# Patient Record
Sex: FEMALE | Race: WHITE | Employment: STUDENT | URBAN - METROPOLITAN AREA
[De-identification: names, ages, dates, MRNs, and addresses within clinical notes are randomized per-mention and may not be internally consistent; named-entity substitution may affect disease eponyms.]

---
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
## 2023-02-16 ENCOUNTER — TELEPHONE (OUTPATIENT)
Dept: FAMILY MEDICINE CLINIC | Facility: CLINIC | Age: 18
End: 2023-02-16

## 2023-02-16 NOTE — TELEPHONE ENCOUNTER
Pt called this am and left msg on vm that she found a lump on her right breast.     Called pt back and she said that she found a lump (difference) on her right breast.   Area is hard. No swelling. No pain. She is due to get her period. Her breasts do feel tender she said but most likely because her period is due. Please advise.

## 2023-03-06 ENCOUNTER — OFFICE VISIT (OUTPATIENT)
Dept: FAMILY MEDICINE CLINIC | Facility: CLINIC | Age: 18
End: 2023-03-06
Payer: COMMERCIAL

## 2023-03-06 VITALS
SYSTOLIC BLOOD PRESSURE: 118 MMHG | TEMPERATURE: 98 F | WEIGHT: 104 LBS | HEART RATE: 84 BPM | DIASTOLIC BLOOD PRESSURE: 64 MMHG | RESPIRATION RATE: 16 BRPM | OXYGEN SATURATION: 98 % | HEIGHT: 59 IN | BODY MASS INDEX: 20.96 KG/M2

## 2023-03-06 DIAGNOSIS — Z13.228 SCREENING FOR ENDOCRINE, NUTRITIONAL, METABOLIC AND IMMUNITY DISORDER: ICD-10-CM

## 2023-03-06 DIAGNOSIS — Z13.29 SCREENING FOR ENDOCRINE, NUTRITIONAL, METABOLIC AND IMMUNITY DISORDER: ICD-10-CM

## 2023-03-06 DIAGNOSIS — Z30.011 OCP (ORAL CONTRACEPTIVE PILLS) INITIATION: ICD-10-CM

## 2023-03-06 DIAGNOSIS — Z13.21 SCREENING FOR ENDOCRINE, NUTRITIONAL, METABOLIC AND IMMUNITY DISORDER: ICD-10-CM

## 2023-03-06 DIAGNOSIS — N63.13 MASS OF LOWER OUTER QUADRANT OF RIGHT BREAST: Primary | ICD-10-CM

## 2023-03-06 DIAGNOSIS — R20.2 TINGLING IN EXTREMITIES: ICD-10-CM

## 2023-03-06 DIAGNOSIS — Z13.0 SCREENING FOR ENDOCRINE, NUTRITIONAL, METABOLIC AND IMMUNITY DISORDER: ICD-10-CM

## 2023-03-06 PROCEDURE — 3008F BODY MASS INDEX DOCD: CPT | Performed by: FAMILY MEDICINE

## 2023-03-06 PROCEDURE — 3078F DIAST BP <80 MM HG: CPT | Performed by: FAMILY MEDICINE

## 2023-03-06 PROCEDURE — 3074F SYST BP LT 130 MM HG: CPT | Performed by: FAMILY MEDICINE

## 2023-03-06 PROCEDURE — 99214 OFFICE O/P EST MOD 30 MIN: CPT | Performed by: FAMILY MEDICINE

## 2023-03-06 RX ORDER — LEVONORGESTREL AND ETHINYL ESTRADIOL 0.1-0.02MG
1 KIT ORAL DAILY
Qty: 3 EACH | Refills: 3 | Status: SHIPPED | OUTPATIENT
Start: 2023-03-06 | End: 2024-03-05

## 2023-03-07 PROBLEM — R20.2 TINGLING IN EXTREMITIES: Status: ACTIVE | Noted: 2023-03-07

## 2023-03-07 PROBLEM — N63.13 MASS OF LOWER OUTER QUADRANT OF RIGHT BREAST: Status: ACTIVE | Noted: 2023-03-07

## 2023-03-31 ENCOUNTER — HOSPITAL ENCOUNTER (OUTPATIENT)
Dept: ULTRASOUND IMAGING | Age: 18
Discharge: HOME | End: 2023-03-31
Attending: FAMILY MEDICINE
Payer: COMMERCIAL

## 2023-03-31 DIAGNOSIS — N63.13 MASS OF LOWER OUTER QUADRANT OF RIGHT BREAST: ICD-10-CM

## 2023-03-31 PROCEDURE — 76642 ULTRASOUND BREAST LIMITED: CPT | Performed by: FAMILY MEDICINE

## 2023-03-31 NOTE — PROGRESS NOTES
Breast ultrasound shows benign breast tissue that is very dense, which could feel like a mass. Follow-up if you continue to feel any abnormalities.

## 2023-08-02 ENCOUNTER — HOSPITAL ENCOUNTER (OUTPATIENT)
Age: 18
Discharge: HOME | End: 2023-08-02
Payer: COMMERCIAL

## 2023-08-02 VITALS
OXYGEN SATURATION: 97 % | TEMPERATURE: 98 F | HEART RATE: 57 BPM | BODY MASS INDEX: 19.63 KG/M2 | SYSTOLIC BLOOD PRESSURE: 108 MMHG | WEIGHT: 100 LBS | DIASTOLIC BLOOD PRESSURE: 75 MMHG | HEIGHT: 60 IN | RESPIRATION RATE: 18 BRPM

## 2023-08-02 DIAGNOSIS — L20.9 ATOPIC DERMATITIS, UNSPECIFIED TYPE: Primary | ICD-10-CM

## 2023-08-02 PROCEDURE — 99213 OFFICE O/P EST LOW 20 MIN: CPT

## 2023-08-02 RX ORDER — PREDNISONE 10 MG/1
TABLET ORAL
Qty: 20 TABLET | Refills: 0 | Status: SHIPPED | OUTPATIENT
Start: 2023-08-02 | End: 2023-08-09

## 2023-08-30 ENCOUNTER — OFFICE VISIT (OUTPATIENT)
Dept: FAMILY MEDICINE CLINIC | Facility: CLINIC | Age: 18
End: 2023-08-30
Payer: COMMERCIAL

## 2023-08-30 VITALS
WEIGHT: 96 LBS | TEMPERATURE: 98 F | SYSTOLIC BLOOD PRESSURE: 98 MMHG | HEIGHT: 60 IN | OXYGEN SATURATION: 98 % | BODY MASS INDEX: 18.85 KG/M2 | HEART RATE: 68 BPM | RESPIRATION RATE: 18 BRPM | DIASTOLIC BLOOD PRESSURE: 58 MMHG

## 2023-08-30 DIAGNOSIS — R63.6 LOW WEIGHT: ICD-10-CM

## 2023-08-30 DIAGNOSIS — Z00.00 WELL FEMALE EXAM WITHOUT GYNECOLOGICAL EXAM: ICD-10-CM

## 2023-08-30 DIAGNOSIS — N92.0 SPOTTING: ICD-10-CM

## 2023-08-30 DIAGNOSIS — Z11.3 ROUTINE SCREENING FOR STI (SEXUALLY TRANSMITTED INFECTION): ICD-10-CM

## 2023-08-30 DIAGNOSIS — L30.9 ECZEMA OF FACE: ICD-10-CM

## 2023-08-30 DIAGNOSIS — Z30.41 ENCOUNTER FOR BIRTH CONTROL PILLS MAINTENANCE: Primary | ICD-10-CM

## 2023-08-30 LAB
CONTROL LINE PRESENT WITH A CLEAR BACKGROUND (YES/NO): YES YES/NO
PREGNANCY TEST, URINE: NEGATIVE

## 2023-08-30 PROCEDURE — 81025 URINE PREGNANCY TEST: CPT | Performed by: FAMILY MEDICINE

## 2023-08-30 PROCEDURE — 3078F DIAST BP <80 MM HG: CPT | Performed by: FAMILY MEDICINE

## 2023-08-30 PROCEDURE — 3074F SYST BP LT 130 MM HG: CPT | Performed by: FAMILY MEDICINE

## 2023-08-30 PROCEDURE — 87491 CHLMYD TRACH DNA AMP PROBE: CPT | Performed by: FAMILY MEDICINE

## 2023-08-30 PROCEDURE — 99212 OFFICE O/P EST SF 10 MIN: CPT | Performed by: FAMILY MEDICINE

## 2023-08-30 PROCEDURE — 3008F BODY MASS INDEX DOCD: CPT | Performed by: FAMILY MEDICINE

## 2023-08-30 PROCEDURE — 87591 N.GONORRHOEAE DNA AMP PROB: CPT | Performed by: FAMILY MEDICINE

## 2023-08-30 PROCEDURE — 99395 PREV VISIT EST AGE 18-39: CPT | Performed by: FAMILY MEDICINE

## 2023-08-31 PROBLEM — N63.13 MASS OF LOWER OUTER QUADRANT OF RIGHT BREAST: Status: RESOLVED | Noted: 2023-03-07 | Resolved: 2023-08-31

## 2023-08-31 PROBLEM — R20.2 TINGLING IN EXTREMITIES: Status: RESOLVED | Noted: 2023-03-07 | Resolved: 2023-08-31

## 2023-08-31 LAB
C TRACH DNA SPEC QL NAA+PROBE: NEGATIVE
N GONORRHOEA DNA SPEC QL NAA+PROBE: NEGATIVE

## 2023-09-28 PROBLEM — R59.0 INGUINAL LYMPHADENOPATHY: Status: ACTIVE | Noted: 2023-09-28

## 2023-09-28 PROBLEM — F51.04 PSYCHOPHYSIOLOGICAL INSOMNIA: Status: ACTIVE | Noted: 2023-09-28

## 2023-09-28 PROBLEM — R63.0 DECREASED APPETITE: Status: ACTIVE | Noted: 2023-09-28

## 2023-09-28 PROBLEM — R53.82 CHRONIC FATIGUE: Status: ACTIVE | Noted: 2023-09-28

## 2023-09-28 PROBLEM — F41.1 GAD (GENERALIZED ANXIETY DISORDER): Status: ACTIVE | Noted: 2023-09-28

## 2023-09-28 PROBLEM — R63.4 WEIGHT LOSS: Status: ACTIVE | Noted: 2021-04-21

## 2023-10-24 DIAGNOSIS — Z30.011 OCP (ORAL CONTRACEPTIVE PILLS) INITIATION: ICD-10-CM

## 2023-10-24 RX ORDER — LEVONORGESTREL AND ETHINYL ESTRADIOL 0.1-0.02MG
1 KIT ORAL DAILY
Qty: 3 EACH | Refills: 3 | Status: SHIPPED | OUTPATIENT
Start: 2023-10-24 | End: 2024-10-23

## 2023-11-01 DIAGNOSIS — F41.1 GAD (GENERALIZED ANXIETY DISORDER): ICD-10-CM

## 2023-11-01 PROCEDURE — 87591 N.GONORRHOEAE DNA AMP PROB: CPT | Performed by: FAMILY MEDICINE

## 2023-11-01 PROCEDURE — 87510 GARDNER VAG DNA DIR PROBE: CPT | Performed by: FAMILY MEDICINE

## 2023-11-01 PROCEDURE — 87491 CHLMYD TRACH DNA AMP PROBE: CPT | Performed by: FAMILY MEDICINE

## 2023-11-01 PROCEDURE — 87529 HSV DNA AMP PROBE: CPT | Performed by: FAMILY MEDICINE

## 2023-11-01 PROCEDURE — 87660 TRICHOMONAS VAGIN DIR PROBE: CPT | Performed by: FAMILY MEDICINE

## 2023-11-01 PROCEDURE — 87480 CANDIDA DNA DIR PROBE: CPT | Performed by: FAMILY MEDICINE

## 2023-11-02 PROBLEM — N90.89 LABIAL LESION: Status: ACTIVE | Noted: 2023-11-02

## 2023-11-02 RX ORDER — METRONIDAZOLE 7.5 MG/G
1 GEL VAGINAL NIGHTLY
Qty: 1 EACH | Refills: 0 | Status: SHIPPED | OUTPATIENT
Start: 2023-11-02 | End: 2023-11-07

## 2023-12-09 PROBLEM — R63.4 WEIGHT LOSS: Status: RESOLVED | Noted: 2021-04-21 | Resolved: 2023-12-09

## 2023-12-09 PROBLEM — N90.89 LABIAL LESION: Status: RESOLVED | Noted: 2023-11-02 | Resolved: 2023-12-09

## 2023-12-09 PROBLEM — H01.135 ECZEMATOUS DERMATITIS OF UPPER AND LOWER EYELIDS OF BOTH EYES: Status: ACTIVE | Noted: 2023-12-09

## 2023-12-09 PROBLEM — R59.0 INGUINAL LYMPHADENOPATHY: Status: RESOLVED | Noted: 2023-09-28 | Resolved: 2023-12-09

## 2023-12-09 PROBLEM — H01.134 ECZEMATOUS DERMATITIS OF UPPER AND LOWER EYELIDS OF BOTH EYES: Status: ACTIVE | Noted: 2023-12-09

## 2023-12-09 PROBLEM — Z86.19 HISTORY OF POSITIVE PCR FOR HERPES SIMPLEX VIRUS TYPE 1 (HSV-1) DNA: Status: ACTIVE | Noted: 2023-12-09

## 2023-12-09 PROBLEM — H01.131 ECZEMATOUS DERMATITIS OF UPPER AND LOWER EYELIDS OF BOTH EYES: Status: ACTIVE | Noted: 2023-12-09

## 2023-12-09 PROBLEM — Z79.899 MEDICATION MANAGEMENT: Status: ACTIVE | Noted: 2022-07-16

## 2023-12-09 PROBLEM — R53.82 CHRONIC FATIGUE: Status: RESOLVED | Noted: 2023-09-28 | Resolved: 2023-12-09

## 2023-12-09 PROBLEM — H01.132 ECZEMATOUS DERMATITIS OF UPPER AND LOWER EYELIDS OF BOTH EYES: Status: ACTIVE | Noted: 2023-12-09

## 2024-02-08 ENCOUNTER — OFFICE VISIT (OUTPATIENT)
Dept: FAMILY MEDICINE CLINIC | Facility: CLINIC | Age: 19
End: 2024-02-08
Payer: COMMERCIAL

## 2024-02-08 VITALS
TEMPERATURE: 98 F | HEART RATE: 72 BPM | HEIGHT: 59 IN | OXYGEN SATURATION: 98 % | WEIGHT: 106 LBS | BODY MASS INDEX: 21.37 KG/M2 | RESPIRATION RATE: 16 BRPM

## 2024-02-08 DIAGNOSIS — Z20.822 EXPOSURE TO COVID-19 VIRUS: Primary | ICD-10-CM

## 2024-02-08 PROCEDURE — 99212 OFFICE O/P EST SF 10 MIN: CPT | Performed by: NURSE PRACTITIONER

## 2024-02-08 PROCEDURE — 87635 SARS-COV-2 COVID-19 AMP PRB: CPT | Performed by: NURSE PRACTITIONER

## 2024-02-08 PROCEDURE — 3008F BODY MASS INDEX DOCD: CPT | Performed by: NURSE PRACTITIONER

## 2024-02-08 NOTE — PROGRESS NOTES
CHIEF COMPLAINT:     Chief Complaint   Patient presents with    Covid     Exposed to covid-19, no symptoms        HPI:   Yajaira Espinal is a 18 year old female who presents for Covid 19 exposure .  Denies GI symptoms, respiratory symptoms, body aches, fever.  Requesting covid testing.  At this time not taking any OTC medications.  Would like to be seen and have Covid testing for exposure.  Vaccine status: pt is vaccinated, had mild covid in 2022      Current Outpatient Medications   Medication Sig Dispense Refill    sertraline 50 MG Oral Tab Take 1 tablet (50 mg total) by mouth daily. 90 tablet 0    Levonorgestrel-Ethinyl Estrad 0.1-20 MG-MCG Oral Tab Take 1 tablet by mouth daily. 3 each 3    Pediatric Multivit-Minerals-C (SMARTY PANTS KIDS COMPLETE) Oral Chew Tab Chew 2 tablets by mouth daily.      Carbonyl Iron (IRON CHEWS PEDIATRIC OR) Take 1 tablet by mouth every other day.        Past Medical History:   Diagnosis Date    Anxiety     Labial lesion     Situational anxiety 04/21/2021    Weight loss     Weight loss, non-intentional 04/21/2021      Past Surgical History:   Procedure Laterality Date    ADENOIDECTOMY  2008    TONSILLECTOMY  2008         Social History     Socioeconomic History    Marital status: Single   Tobacco Use    Smoking status: Never    Smokeless tobacco: Never   Vaping Use    Vaping Use: Never used   Substance and Sexual Activity    Alcohol use: No     Alcohol/week: 0.0 standard drinks of alcohol    Drug use: No    Sexual activity: Yes     Partners: Male   Other Topics Concern     Service No    Blood Transfusions No    Caffeine Concern Yes     Comment: 1 iced coffee weekly    Hobby Hazards No    Sleep Concern No    Stress Concern No    Weight Concern No    Special Diet No    Back Care No    Exercise Yes     Comment: active daily    Bike Helmet No    Seat Belt Yes    Self-Exams No         REVIEW OF SYSTEMS:   GENERAL: feels well otherwise,   intact appetite  SKIN: no rashes or abnormal  skin lesions  HEENT: See HPI  LUNGS: denies shortness of breath or wheezing, See HPI  CARDIOVASCULAR: denies chest pain or palpitations   GI: denies N/V/C or abdominal pain  NEURO: Denies headaches    EXAM:   Pulse 72   Temp 98.1 °F (36.7 °C) (Temporal)   Resp 16   Ht 4' 11\" (1.499 m)   Wt 106 lb (48.1 kg)   LMP 01/22/2024 (Exact Date)   SpO2 98%   BMI 21.41 kg/m²   GENERAL: well developed, well nourished, in no apparent distress  SKIN: no rashes  THROAT: Oral mucosa pink, moist. Posterior pharynx is not erythematous. no exudates.    NECK: Supple, non-tender  LUNGS: clear to auscultation bilaterally; good air movement.  Breathing is non labored.  CARDIO: RRR without murmur  GI: active BS's x4,no masses, hepatosplenomegaly, or tenderness on direct palpation  LYMPH:  no cervical lymphadenopathy.    PSYCH: pleasant mood and affect  NEURO: no focal deficits      ASSESSMENT AND PLAN:   Yajaira Espinal is a 18 year old female who presents with     ASSESSMENT:   Encounter Diagnosis   Name Primary?    Exposure to COVID-19 virus Yes       PLAN:   Covid test sent     OTC Tylenol as needed.  Reviewed symptom relief measures with patient if symptoms develop.     To f/u with Clinic / PCP if any problems or if symptoms begin after testing negative.   Pt verbalized understanding and agrees to plan.               There are no Patient Instructions on file for this visit.        The patient indicates understanding of these issues and agrees to the plan.

## 2024-02-09 LAB — SARS-COV-2 RNA RESP QL NAA+PROBE: DETECTED

## 2024-04-03 ENCOUNTER — TELEPHONE (OUTPATIENT)
Dept: FAMILY MEDICINE CLINIC | Facility: CLINIC | Age: 19
End: 2024-04-03

## 2024-04-03 NOTE — TELEPHONE ENCOUNTER
Yajaira Espinal was scheduled for an appt on 4/3/24 with a visit reason of red spots on skin .    Letter/MyChart message sent to pt notifying if missed appointment.

## 2024-04-15 DIAGNOSIS — Z30.011 OCP (ORAL CONTRACEPTIVE PILLS) INITIATION: ICD-10-CM

## 2024-04-15 DIAGNOSIS — F41.1 GAD (GENERALIZED ANXIETY DISORDER): ICD-10-CM

## 2024-04-16 RX ORDER — LEVONORGESTREL AND ETHINYL ESTRADIOL 0.1-0.02MG
1 KIT ORAL DAILY
Qty: 3 EACH | Refills: 3 | Status: SHIPPED | OUTPATIENT
Start: 2024-04-16 | End: 2025-04-16

## 2024-04-16 NOTE — TELEPHONE ENCOUNTER
Requested Prescriptions     Signed Prescriptions Disp Refills    Levonorgestrel-Ethinyl Estrad 0.1-20 MG-MCG Oral Tab 3 each 3     Sig: Take 1 tablet by mouth daily.     Authorizing Provider: OKSANA MORRISON     Ordering User: ANA MARIA ESTRADA    sertraline 50 MG Oral Tab 90 tablet 0     Sig: Take 1 tablet (50 mg total) by mouth daily.     Authorizing Provider: OKSANA MORRISON     Ordering User: ANA MARIA ESTRADA     Refilled per protocol/OV notes

## 2025-05-01 ENCOUNTER — HOSPITAL ENCOUNTER (OUTPATIENT)
Age: 20
Discharge: HOME | End: 2025-05-01
Payer: COMMERCIAL

## 2025-05-01 VITALS
WEIGHT: 115 LBS | OXYGEN SATURATION: 98 % | SYSTOLIC BLOOD PRESSURE: 114 MMHG | DIASTOLIC BLOOD PRESSURE: 70 MMHG | HEART RATE: 72 BPM | RESPIRATION RATE: 16 BRPM | TEMPERATURE: 98 F | BODY MASS INDEX: 23 KG/M2

## 2025-05-01 DIAGNOSIS — H01.004 BLEPHARITIS OF LEFT UPPER EYELID, UNSPECIFIED TYPE: Primary | ICD-10-CM

## 2025-05-01 PROCEDURE — 99213 OFFICE O/P EST LOW 20 MIN: CPT

## 2025-05-01 RX ORDER — ERYTHROMYCIN 5 MG/G
1 OINTMENT OPHTHALMIC EVERY 6 HOURS
Qty: 1 G | Refills: 0 | Status: SHIPPED | OUTPATIENT
Start: 2025-05-01 | End: 2025-05-08

## 2025-05-01 NOTE — ED PROVIDER NOTES
History   No chief complaint on file.      Subjective:   HPI    Yajaira Espinal, 19 year old female with notable medical history of dermatitis who presents with Left eyelid swelling. Patient reports swelling started yesterday and persisted today. She reports Hx dermatitis to eyelids, but reports presentation is different. Denies vision changes, discharge, contact lens use, other symptoms / concerns.      Problem List[1]   Objective:   No pertinent past medical history.            No pertinent past surgical history.              No pertinent social history.            Medications Ordered Prior to Encounter[2]      Constitutional and vital signs reviewed.      All other systems reviewed and negative except as noted above.    I have reviewed the family history, social history, allergies, and outpatient medications.     History reviewed from EMR: Encounters, problem list, allergies, medications      Physical Exam     ED Triage Vitals [05/01/25 1001]   /70   Pulse 72   Resp 16   Temp 98.4 °F (36.9 °C)   Temp src Oral   SpO2 98 %   O2 Device None (Room air)       Current:/70   Pulse 72   Temp 98.4 °F (36.9 °C) (Oral)   Resp 16   Wt 52.2 kg   LMP 04/29/2025 (Exact Date)   SpO2 98%   BMI 23.23 kg/m²       Physical Exam  Vitals and nursing note reviewed.   Constitutional:       General: She is not in acute distress.     Appearance: Normal appearance. She is normal weight. She is not ill-appearing or toxic-appearing.   HENT:      Head: Normocephalic and atraumatic.      Right Ear: External ear normal.      Left Ear: External ear normal.      Nose: Nose normal.      Mouth/Throat:      Mouth: Mucous membranes are moist.   Eyes:      General:         Right eye: No foreign body, discharge or hordeolum.         Left eye: No foreign body, discharge or hordeolum.      Extraocular Movements: Extraocular movements intact.      Conjunctiva/sclera: Conjunctivae normal.      Pupils: Pupils are equal, round, and  reactive to light.      Comments: Swelling and erythema to Left upper eyelid. EOMs intact. Conjunctiva WNL.   Cardiovascular:      Rate and Rhythm: Normal rate.      Pulses: Normal pulses.   Pulmonary:      Effort: Pulmonary effort is normal. No respiratory distress.   Musculoskeletal:         General: No swelling, tenderness or signs of injury. Normal range of motion.      Cervical back: Normal range of motion and neck supple.   Skin:     General: Skin is warm and dry.      Capillary Refill: Capillary refill takes less than 2 seconds.      Coloration: Skin is not jaundiced.   Neurological:      General: No focal deficit present.      Mental Status: She is alert and oriented to person, place, and time. Mental status is at baseline.   Psychiatric:         Mood and Affect: Mood normal.         Behavior: Behavior normal. Behavior is cooperative.         Thought Content: Thought content normal.         Judgment: Judgment normal.            ED Course     Labs Reviewed - No data to display  No orders to display       Vitals:    05/01/25 1001   BP: 114/70   Pulse: 72   Resp: 16   Temp: 98.4 °F (36.9 °C)   TempSrc: Oral   SpO2: 98%   Weight: 52.2 kg            [facility]        Yajaira Cori Teddy, 19 year old female with medical history as noted above who presents with eyelid concern   - Patient in NAD, VSS   - Blepharitis vs stye vs chalazion vs other   - Exam c/w blepharitis   - Supportive care and infection control measures discussed   - Facial hygiene discussed   - Follow up with primary care provider as needed         ** See ED course below for additional information on care provided / interventions / notable events throughout patient's encounter.           ** I have independently reviewed the radiology images, clinical lab results, and ECG tracings as described above (if applicable)    ** Concerning co-morbidities possibly affecting complaint / care: n/a        Medical Decision Making  Risk  OTC drugs.  Prescription drug  management.        Disposition and Plan     Disposition:  Discharge  5/1/2025 10:48 am    Clinical Impression:  1. Blepharitis of left upper eyelid, unspecified type            Home care instructions:      Blepharitis / Eyelid infection care measures   - Apply antibiotic ointment to lid margins 4x daily for up to a month (only use while having symptoms) (ointment is OK to go into your eye)   - Apply warm compressed to closed lid for 5-10 minutes, 2-4 times a day. Then, massage lids with cotton swab soaked in a mixture of baby shampoo and water (1:1 ratio)   - Do not use contacts   - Change pillow cases daily for the next couple days   - You may warrant re-evaluation if you have diffuse redness and/or swelling to the area around your eye   - Follow up with your primary care provider as needed      Follow-up:  Yudy Mccormack DO  [address] [facility] [address]  [facility] [address] [phone]      As needed          Medications Prescribed:  Current Discharge Medication List        START taking these medications    Details   erythromycin 5 MG/GM Ophthalmic Ointment Apply 1 Application to eye every 6 (six) hours for 7 days. Apply to lid margin  Qty: 1 g, Refills: 0               Delgado Hernandez, DNP, APRN, AGACNP-BC, FNP-C, CNL  Adult-Gerontology Acute Care & Family Nurse Practitioner  [facility]      The above patient (and/or guardian) was made aware that an appropriate evaluation has been performed, and that no additional testing is required at this time. In my medical judgment, there is currently no evidence of an immediate life-threatening or surgical condition, therefore discharge is indicated at this time. The patient (and/or guardian) was advised that a small risk still exists that a serious condition could develop. The patient was instructed to arrange close follow-up with their primary care provider (or the referral provider given today). The patient received written and verbal instructions regarding  their condition / concerns, demonstrated understanding, and is agreement with the outpatient treatment plan.              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     Current Outpatient Medications on File Prior to Encounter   Medication Sig Dispense Refill    Levonorgestrel-Ethinyl Estrad 0.1-20 MG-MCG Oral Tab Take 1 tablet by mouth daily. 3 each 3    sertraline 50 MG Oral Tab Take 1 tablet (50 mg total) by mouth daily. 90 tablet 0    Pediatric Multivit-Minerals-C (SMARTY PANTS KIDS COMPLETE) Oral Chew Tab Chew 2 tablets by mouth daily.      Carbonyl Iron (IRON CHEWS PEDIATRIC OR) Take 1 tablet by mouth every other day.

## 2025-05-01 NOTE — DISCHARGE INSTRUCTIONS
Blepharitis / Eyelid infection care measures   - Apply antibiotic ointment to lid margins 4x daily for up to a month (only use while having symptoms) (ointment is OK to go into your eye)   - Apply warm compressed to closed lid for 5-10 minutes, 2-4 times a day. Then, massage lids with cotton swab soaked in a mixture of baby shampoo and water (1:1 ratio)   - Do not use contacts   - Change pillow cases daily for the next couple days   - You may warrant re-evaluation if you have diffuse redness and/or swelling to the area around your eye   - Follow up with your primary care provider as needed

## (undated) DIAGNOSIS — L24.3 IRRITANT CONTACT DERMATITIS DUE TO COSMETICS: Primary | ICD-10-CM

## (undated) NOTE — LETTER
4/3/2024    Yajaira Espinal  [address] [place] [id number]    Dear Yajaira,    We would like to inform you that your account has been charged $40 for not showing up to the office for your scheduled appointment on 4/3/24.    Our no-show policy is as follows: A 24-hour notice is required, or you may be charged a $40 No Show fee.      If you are unable to keep your scheduled appointment, please notify us at least 24 hours in advance so we can accommodate our other patients. You may also reschedule your appointment at that time.    On the third no-show, within a 12-month period, it will be the physician’s discretion as to whether a discharge letter will be sent out disengaging you from the practice and giving you 30 days to enroll with a new non [facility] physician.    If you need any assistance in attending your appointments, please call Patient Experience at [phone] so that we may help you identify possible solutions.    Sincerely,  [facility]

## (undated) NOTE — LETTER
[address] [facility]       Student's Name  Ray Lazcano Birth Date Date     Signature                                                                                                                                              Title                           Date    (If adding dates to the above immun ALLERGIES  (Food, drug, insect, other)  Benadryl [Diphenhydramine] MEDICATION  (List all prescribed or taken on a regular basis.)    Current Outpatient Medications:   •  Pediatric Multivit-Minerals-C (SMARTY PANTS KIDS COMPLETE) Oral Chew Tab, Chew 2 table Signature                                          Date     PHYSICAL EXAMINATION REQUIREMENTS    Entire section below to be completed by MD//APN/PA       PHYSICAL EXAMINATION REQUIREMENTS (head circumference if <33 years old):   BP 96/60   Pulse 54   Te Normal Comments/Follow-up/Needs  Normal Comments/Follow-up/Needs   Skin {YES:829::\"Yes\"}  Endocrine {YES:829::\"Yes\"}    Ears {YES:829::\"Yes\"}                      Screen result: Gastrointestinal {YES:829::\"Yes\"}    Eyes {YES:829::\"Yes\"}     Scree Y/N/MODIFIED:1483::\"Yes\"}   Physician/Advanced Practice Nurse/Physician Assistant performing examination  Print Name  Juan Coronel PA-C                                                 Signature

## (undated) NOTE — MR AVS SNAPSHOT
[facility]  [phone]               Thank you for choosing us for your health care visit with Chase Charles PA-C.   We are glad to serve you and happy to provide you with [facility] During the teen years, it’s important to keep having yearly checkups. Your teen may be embarrassed about having a checkup. Reassure your teen that the exam is normal and necessary.  Be aware that the healthcare provider may ask to talk with your child witho and deeper. As the penis matures, erections and wet dreams will start to happen. Talk to your teen about what to expect, and help him or her deal with these changes when possible. · Emotional changes.  Along with these physical changes, you’ll likely notic eats breakfast. If your teen does not like the food served at school for lunch, allow him or her to prepare a bag lunch. · Have at least one family meal with you each day. Busy schedules often limit time for sitting and talking.  Sitting and eating togethe periodically by calling to ask where he or she is and what he or she is doing. · Make sure cell phones and portable music players are used safely and responsibly.  Help your teen understand that it is dangerous to talk on the phone, text, or listen to [facility] depressed for more than 2 weeks, you should be concerned.  Signs of depression include:  · Use of drugs or alcohol  · Problems in school and at home  · Frequent episodes of running away  · Thoughts or talk of death or suicide  · Withdrawal from family and f information on getting   Proxy Access to your child’s MyChart go to [email or web address]://[email or web address] org and click on the   Sign Up Forms link in the Additional Information box on the right. MyChart Questions? Call [phone] for help.   MyChart is NOT to o Regularly eating meals together as a family  o Limiting fast food, take out food, and eating out at restaurants  o Preparing foods at home as a family  o Eating a diet rich in calcium  o Eating a high fiber diet    Help your children form healthy habits.

## (undated) NOTE — LETTER
Name:  Paula Wolfe Year:  12th Grade Class: Student ID No.:   Address:  [address] [place] [address] Phone:  [phone] (home)  :  14year old   Name Relationship [address]gl [address] Work Phone Home Phone Mobile Phone   Amy 5 FAMILY    13. Has any family member or relative  of heart problems or had an unexpected or unexplained sudden death before age 48? (including drowning, unexplained car accident, or sudden infant death syndrome)? No   14.  Does anyone in your family have you had infectious mono within the last month? No   32. Do you have any rashes, pressure sores, or other skin problems? No   33. Have you had a herpes or MRSA skin infection? No   34. Have you ever had a head injury or concussion? No   35.  Have you ever ha Date:8/16/2021                               EXAMINATION   There were no vitals taken for this visit. No height and weight on file for this encounter.  female    Vision: R      20/20     L      20/20      BOTH      20/20          MEDICAL NORMAL ABNORMAL FIN to sign off on physicals.    [facility] Substance Testing Policy Consent to Random Testing   (This section for high school students only)   5107-1760 school term    As a prerequisite to participation in [employer] activities, we agree that I/our student will n

## (undated) NOTE — LETTER
Date & Time: 12/14/2022, 9:13 AM  Patient: Luiz Gitelman Capua  Encounter Provider(s):    MINH Tilley       To Whom It May Concern:    Bere José was seen and treated in our department on 12/14/2022. She should not return to work until December 16, 2022.     If you have any questions or concerns, please do not hesitate to call.        _____________________________  Physician/APC Signature

## (undated) NOTE — LETTER
Date & Time: 5/1/2025, 10:46 AM  Patient: Yajaira Espinal  Encounter Provider(s):    Delgado Hernandez APRN       To Whom It May Concern:    Yajaira Espinal was seen and treated in our department on 05/01/25. Please excuse her from school until 05/02/25 when she can return if without fever (<100.4) and if feeling better.    If you have any questions or concerns, please do not hesitate to call.        __________________________________________  Delgado Hernandez DNP, APRN, AGACNP-BC, FNP-C, CNL  Adult-Gerontology Acute Care & Family Nurse Practitioner  [facility]